# Patient Record
Sex: FEMALE | Race: WHITE | NOT HISPANIC OR LATINO | Employment: FULL TIME | URBAN - METROPOLITAN AREA
[De-identification: names, ages, dates, MRNs, and addresses within clinical notes are randomized per-mention and may not be internally consistent; named-entity substitution may affect disease eponyms.]

---

## 2021-09-23 ENCOUNTER — TELEPHONE (OUTPATIENT)
Dept: ORTHOPEDIC SURGERY | Facility: CLINIC | Age: 61
End: 2021-09-23

## 2021-09-23 NOTE — TELEPHONE ENCOUNTER
REQ FOR APPT / AWAITING RECORDS:  LEFT ANKLE FX 9-, [facility] TO SEND RECORDS.     [employer] INS[employer]ANCE, NO AUTO/WC CLAIM, PREV SURGERY OR ORTHOS

## 2021-09-27 ENCOUNTER — TELEPHONE (OUTPATIENT)
Dept: UROLOGY | Facility: CLINIC | Age: 61
End: 2021-09-27

## 2021-09-27 NOTE — TELEPHONE ENCOUNTER
REQ FOR APPT: LEFT ANKLE FX 9-, [facility] TO SEND RECORDS.     [employer] INSURANCE, NO AUTO/WC CLAIM, PREV SURGERY OR ORTHOS

## 2021-09-27 NOTE — TELEPHONE ENCOUNTER
LMOM FOR CB. NEED TO ASK WHO HER ORIGINAL UROLOGY CONSULT WAS WITH AND WHERE/WHEN SHE HAD IMAGING DONE.  I NEED TO SPEAK WITH HER PLEASE.

## 2021-09-29 ENCOUNTER — OFFICE VISIT (OUTPATIENT)
Dept: ORTHOPEDIC SURGERY | Facility: CLINIC | Age: 61
End: 2021-09-29

## 2021-09-29 ENCOUNTER — PREP FOR SURGERY (OUTPATIENT)
Dept: OTHER | Facility: HOSPITAL | Age: 61
End: 2021-09-29

## 2021-09-29 VITALS — HEIGHT: 67 IN | WEIGHT: 177 LBS | OXYGEN SATURATION: 95 % | HEART RATE: 82 BPM | BODY MASS INDEX: 27.78 KG/M2

## 2021-09-29 DIAGNOSIS — S82.892A CLOSED FRACTURE OF LEFT ANKLE, INITIAL ENCOUNTER: Primary | ICD-10-CM

## 2021-09-29 DIAGNOSIS — M25.572 LEFT ANKLE PAIN, UNSPECIFIED CHRONICITY: ICD-10-CM

## 2021-09-29 PROCEDURE — 29515 APPLICATION SHORT LEG SPLINT: CPT | Performed by: STUDENT IN AN ORGANIZED HEALTH CARE EDUCATION/TRAINING PROGRAM

## 2021-09-29 PROCEDURE — 99204 OFFICE O/P NEW MOD 45 MIN: CPT | Performed by: STUDENT IN AN ORGANIZED HEALTH CARE EDUCATION/TRAINING PROGRAM

## 2021-09-29 RX ORDER — MELOXICAM 15 MG/1
15 TABLET ORAL DAILY
COMMUNITY
End: 2021-09-29

## 2021-09-29 RX ORDER — CLOPIDOGREL BISULFATE 75 MG/1
TABLET ORAL
COMMUNITY
Start: 2021-09-17

## 2021-09-29 RX ORDER — CEFAZOLIN SODIUM 2 G/100ML
2 INJECTION, SOLUTION INTRAVENOUS ONCE
Status: CANCELLED | OUTPATIENT
Start: 2021-09-29 | End: 2021-09-29

## 2021-09-29 RX ORDER — BUDESONIDE AND FORMOTEROL FUMARATE DIHYDRATE 160; 4.5 UG/1; UG/1
2 AEROSOL RESPIRATORY (INHALATION)
COMMUNITY

## 2021-09-29 RX ORDER — HYDROCODONE BITARTRATE AND ACETAMINOPHEN 5; 325 MG/1; MG/1
1 TABLET ORAL EVERY 6 HOURS PRN
Qty: 10 TABLET | Refills: 0 | Status: SHIPPED | OUTPATIENT
Start: 2021-09-29 | End: 2021-10-01 | Stop reason: HOSPADM

## 2021-09-29 RX ORDER — CEFAZOLIN SODIUM IN 0.9 % NACL 3 G/100 ML
3 INTRAVENOUS SOLUTION, PIGGYBACK (ML) INTRAVENOUS ONCE
Status: CANCELLED | OUTPATIENT
Start: 2021-09-29 | End: 2021-09-29

## 2021-09-29 RX ORDER — ROPINIROLE 1 MG/1
TABLET, FILM COATED ORAL
COMMUNITY
Start: 2021-09-10

## 2021-09-29 RX ORDER — HYDROCODONE BITARTRATE AND ACETAMINOPHEN 5; 325 MG/1; MG/1
TABLET ORAL
COMMUNITY
Start: 2021-09-22 | End: 2021-09-29 | Stop reason: SDUPTHER

## 2021-09-29 RX ORDER — PANTOPRAZOLE SODIUM 40 MG/1
40 TABLET, DELAYED RELEASE ORAL DAILY
COMMUNITY

## 2021-09-29 RX ORDER — CLONAZEPAM 0.5 MG/1
TABLET ORAL
COMMUNITY
Start: 2021-09-20

## 2021-09-29 RX ORDER — ASPIRIN 81 MG/1
81 TABLET ORAL DAILY
COMMUNITY

## 2021-09-29 RX ORDER — MONTELUKAST SODIUM 10 MG/1
TABLET ORAL
COMMUNITY
Start: 2021-09-20

## 2021-09-29 RX ORDER — ATORVASTATIN CALCIUM 80 MG/1
TABLET, FILM COATED ORAL
COMMUNITY
Start: 2021-09-10

## 2021-09-29 RX ORDER — ALBUTEROL SULFATE 90 UG/1
2 AEROSOL, METERED RESPIRATORY (INHALATION) EVERY 4 HOURS PRN
COMMUNITY

## 2021-09-29 NOTE — PROGRESS NOTES
"Chief Complaint  Pain of the Left Ankle    Subjective          Rosie Patel presents to [facility] for   History of Present Illness    Rosie presents today for an evaluation of her left ankle. Reports as she was walking down the outside stairs of her mobile home, she fell down 2 stairs. Injury occurred on 9/22/21.  She experienced immediate pain and was initially seen at [facility].  X-rays obtained revealed a lateral malleolus fracture and a short leg splint was applied.  She has been weightbearing in the splint over the last week.  She denies any pain other than the left ankle.  She denies any wound secondary to the injury.  States she has \"weak\" ankles and has twisted her ankles multiple times in the past. Reports having a heart attack in January and having 6 stints palced. Currently on 81mg of aspirin and Plavix.  She did not follow-up with her cardiologist after the procedure.  Smokes roughly 4 cigarettes a day. Currently has kidney stones.  She is a nondiabetic.  Allergies   Allergen Reactions   • Corticosteroids Other (See Comments)     Pt states unable to take due to blood pressure   • Sulfa Antibiotics Itching        Social History     Socioeconomic History   • Marital status: Single     Spouse name: Not on file   • Number of children: Not on file   • Years of education: Not on file   • Highest education level: Not on file   Tobacco Use   • Smoking status: Current Every Day Smoker     Packs/day: 0.25     Types: Cigarettes   • Smokeless tobacco: Never Used   Vaping Use   • Vaping Use: Never used   Substance and Sexual Activity   • Alcohol use: Not Currently   • Drug use: Never   • Sexual activity: Defer        I reviewed the patient's chief complaint, history of present illness, review of systems, past medical history, surgical history, family history, social history, medications, and allergy list.     REVIEW OF SYSTEMS    Constitutional: Denies fevers, chills, " "weight loss  Cardiovascular: Denies chest pain, shortness of breath  Skin: Denies rashes, acute skin changes  Neurologic: Denies headache, loss of consciousness  MSK: left ankle pain      Objective   Vital Signs:   Pulse 82   Ht 170.2 cm (67\")   Wt 80.3 kg (177 lb)   SpO2 95%   BMI 27.72 kg/m²     Body mass index is 27.72 kg/m².    Physical Exam    General: Alert, no acute distress  Cardiac: Intact peripheral pulses  Pulmonary: Nonlabored respirations on room air  Left lower extremity: Short leg splint removed.  Abrasions over medial face of tibia. Moderate diffuse swelling. Bruising on lateral aspect of the ankle and hindfoot.  No wounds about the ankle.  I am able to wrinkle the skin laterally over the lateral malleolus.  Intact sensation over dorsal and plantar foot. Palpable dorsalis pedis pulse. Calf soft and nontender.    Orthopedic Injury Treatment    Date/Time: 9/29/2021 2:31 PM  Performed by: Carlos Jorge MD  Authorized by: Carlos Jorge MD   Injury location: ankle  Location details: left ankle    Anesthesia:  Local anesthesia used: no    Sedation:  Patient sedated: no    Immobilization: splint  Supplies used: Ortho-Glass (COTTON PADDING/ELASTIC BANDAGE)  Post-procedure neurovascular assessment: post-procedure neurovascularly intact  Patient tolerance: patient tolerated the procedure well with no immediate complications          Imaging Results (Most Recent)     Procedure Component Value Units Date/Time    XR Ankle 3+ View Left [[id number]] Resulted: 09/29/21 1532     Updated: 09/29/21 1533    Narrative:      Indications: Follow-up left lateral malleolus fracture    Views: AP, oblique, lateral left ankle    Findings: A displaced lateral malleolus fracture at the level of the   syndesmosis is seen.  Increased lateral and posterior displacement of the   fracture site compared to the injury films.  Talus remains reduced.    Ossicles adjacent to the medial malleolus likely related to prior trauma.    No " other acute fractures noted.  Early arthritic changes at the tibiotalar   joint are seen.  Calcaneal spur noted on the lateral view.    Comparative Data: Data found and reviewed today                     Assessment and Plan    Diagnoses and all orders for this visit:    1. Closed fracture of left ankle, initial encounter (Primary)    2. Left ankle pain, unspecified chronicity  -     XR Ankle 3+ View Left  -     HYDROcodone-acetaminophen (NORCO) 5-325 MG per tablet; Take 1 tablet by mouth Every 6 (Six) Hours As Needed for Moderate Pain .  Dispense: 10 tablet; Refill: 0    Other orders  -     Orthopedic Injury Treatment        Magnolia has a left lateral malleolus fracture.  She has had increased displacement across her fracture site compared to the injury films while ambulating in her splint over the last week.  We discussed both operative and nonoperative management options.  Given the increased displacement over the last week I am concerned that this is an unstable pattern.  We specifically discussed open reduction internal fixation of the left ankle fracture.  We discussed the primary benefits of surgery including improved fracture alignment and ankle stability.  We discussed surgical risks including bleeding, infection, damage to nerves and blood vessels, hardware complications, nonunion, malunion, persistent pain, stiffness, posttraumatic arthritis, anesthesia risk including mortality, DVT/PE, and need for additional procedures.  We also specifically discussed her cardiac risk given her recent myocardial infarction with stenting in January.   Patient expressed understanding and wished to proceed with surgical fixation.  We will reach out to cardiology.  We can delay up to 1 week if needed for optimization.  Discussed ice and elevation and nonweightbearing restriction prior to surgery.  A new short leg splint was applied.    Scribed for Carlos Jorge MD by Ofelia Stack MA.  09/29/21   13:47 EDT      Follow Up    Return for Surgical fixation.   Follow up after surgical fixation  Patient was given instructions and counseling regarding her condition or for health maintenance advice. Please see specific information pulled into the AVS if appropriate.       I have personally performed the services described in this document as scribed by the above individual and it is both accurate and complete.     Carlos Jorge MD  09/30/21  11:28 EDT

## 2021-09-30 ENCOUNTER — ANESTHESIA EVENT (OUTPATIENT)
Dept: PERIOP | Facility: HOSPITAL | Age: 61
End: 2021-09-30

## 2021-10-01 ENCOUNTER — HOSPITAL ENCOUNTER (OUTPATIENT)
Facility: HOSPITAL | Age: 61
Setting detail: HOSPITAL OUTPATIENT SURGERY
Discharge: HOME | End: 2021-10-01
Attending: STUDENT IN AN ORGANIZED HEALTH CARE EDUCATION/TRAINING PROGRAM | Admitting: STUDENT IN AN ORGANIZED HEALTH CARE EDUCATION/TRAINING PROGRAM

## 2021-10-01 ENCOUNTER — TELEPHONE (OUTPATIENT)
Dept: UROLOGY | Facility: CLINIC | Age: 61
End: 2021-10-01

## 2021-10-01 ENCOUNTER — APPOINTMENT (OUTPATIENT)
Dept: GENERAL RADIOLOGY | Facility: HOSPITAL | Age: 61
End: 2021-10-01

## 2021-10-01 ENCOUNTER — ANESTHESIA (OUTPATIENT)
Dept: PERIOP | Facility: HOSPITAL | Age: 61
End: 2021-10-01

## 2021-10-01 VITALS
TEMPERATURE: 96.7 F | OXYGEN SATURATION: 98 % | HEIGHT: 64 IN | SYSTOLIC BLOOD PRESSURE: 190 MMHG | RESPIRATION RATE: 18 BRPM | DIASTOLIC BLOOD PRESSURE: 86 MMHG | WEIGHT: 178.35 LBS | HEART RATE: 59 BPM | BODY MASS INDEX: 30.45 KG/M2

## 2021-10-01 DIAGNOSIS — S82.892A CLOSED FRACTURE OF LEFT ANKLE, INITIAL ENCOUNTER: ICD-10-CM

## 2021-10-01 LAB — QT INTERVAL: 500 MS

## 2021-10-01 PROCEDURE — C1713 ANCHOR/SCREW BN/BN,TIS/BN: HCPCS | Performed by: STUDENT IN AN ORGANIZED HEALTH CARE EDUCATION/TRAINING PROGRAM

## 2021-10-01 PROCEDURE — 73610 X-RAY EXAM OF ANKLE: CPT

## 2021-10-01 PROCEDURE — 25010000002 ROPIVACAINE PER 1 MG: Performed by: ANESTHESIOLOGY

## 2021-10-01 PROCEDURE — 25010000002 DEXAMETHASONE PER 1 MG: Performed by: NURSE ANESTHETIST, CERTIFIED REGISTERED

## 2021-10-01 PROCEDURE — 93010 ELECTROCARDIOGRAM REPORT: CPT | Performed by: INTERNAL MEDICINE

## 2021-10-01 PROCEDURE — 25010000002 FENTANYL CITRATE (PF) 50 MCG/ML SOLUTION: Performed by: NURSE ANESTHETIST, CERTIFIED REGISTERED

## 2021-10-01 PROCEDURE — 25010000003 CEFAZOLIN IN DEXTROSE 2-4 GM/100ML-% SOLUTION: Performed by: STUDENT IN AN ORGANIZED HEALTH CARE EDUCATION/TRAINING PROGRAM

## 2021-10-01 PROCEDURE — 93005 ELECTROCARDIOGRAM TRACING: CPT | Performed by: ANESTHESIOLOGY

## 2021-10-01 PROCEDURE — 25010000002 MIDAZOLAM PER 1MG: Performed by: ANESTHESIOLOGY

## 2021-10-01 PROCEDURE — 76000 FLUOROSCOPY <1 HR PHYS/QHP: CPT

## 2021-10-01 PROCEDURE — 27792 TREATMENT OF ANKLE FRACTURE: CPT | Performed by: STUDENT IN AN ORGANIZED HEALTH CARE EDUCATION/TRAINING PROGRAM

## 2021-10-01 PROCEDURE — 25010000002 HYDROMORPHONE PER 4 MG: Performed by: ANESTHESIOLOGY

## 2021-10-01 PROCEDURE — 25010000002 PROPOFOL 10 MG/ML EMULSION: Performed by: NURSE ANESTHETIST, CERTIFIED REGISTERED

## 2021-10-01 PROCEDURE — 25010000002 ONDANSETRON PER 1 MG: Performed by: NURSE ANESTHETIST, CERTIFIED REGISTERED

## 2021-10-01 DEVICE — BONE SCREW
Type: IMPLANTABLE DEVICE | Site: ANKLE | Status: FUNCTIONAL
Brand: VARIAX

## 2021-10-01 DEVICE — DISTAL LATERAL FIBULA PLATE, 4 HOLE
Type: IMPLANTABLE DEVICE | Site: ANKLE | Status: FUNCTIONAL
Brand: VARIAX

## 2021-10-01 DEVICE — KIRSCHNER WIRE: Type: IMPLANTABLE DEVICE | Site: ANKLE | Status: FUNCTIONAL

## 2021-10-01 DEVICE — LOCKING SCREW
Type: IMPLANTABLE DEVICE | Site: ANKLE | Status: FUNCTIONAL
Brand: VARIAX

## 2021-10-01 RX ORDER — DOCUSATE SODIUM 100 MG/1
100 CAPSULE, LIQUID FILLED ORAL 2 TIMES DAILY PRN
Qty: 20 CAPSULE | Refills: 0 | Status: SHIPPED | OUTPATIENT
Start: 2021-10-01

## 2021-10-01 RX ORDER — ONDANSETRON 4 MG/1
4 TABLET, FILM COATED ORAL EVERY 8 HOURS PRN
Qty: 30 TABLET | Refills: 0 | Status: SHIPPED | OUTPATIENT
Start: 2021-10-01 | End: 2021-10-01 | Stop reason: SDUPTHER

## 2021-10-01 RX ORDER — SODIUM CHLORIDE, SODIUM LACTATE, POTASSIUM CHLORIDE, CALCIUM CHLORIDE 600; 310; 30; 20 MG/100ML; MG/100ML; MG/100ML; MG/100ML
9 INJECTION, SOLUTION INTRAVENOUS CONTINUOUS PRN
Status: DISCONTINUED | OUTPATIENT
Start: 2021-10-01 | End: 2021-10-01 | Stop reason: HOSPADM

## 2021-10-01 RX ORDER — ONDANSETRON 2 MG/ML
4 INJECTION INTRAMUSCULAR; INTRAVENOUS ONCE AS NEEDED
Status: DISCONTINUED | OUTPATIENT
Start: 2021-10-01 | End: 2021-10-01 | Stop reason: HOSPADM

## 2021-10-01 RX ORDER — ROPIVACAINE HYDROCHLORIDE 5 MG/ML
INJECTION, SOLUTION EPIDURAL; INFILTRATION; PERINEURAL
Status: COMPLETED | OUTPATIENT
Start: 2021-10-01 | End: 2021-10-01

## 2021-10-01 RX ORDER — ONDANSETRON 4 MG/1
4 TABLET, FILM COATED ORAL EVERY 8 HOURS PRN
Qty: 30 TABLET | Refills: 0 | Status: SHIPPED | OUTPATIENT
Start: 2021-10-01

## 2021-10-01 RX ORDER — PROPOFOL 10 MG/ML
VIAL (ML) INTRAVENOUS AS NEEDED
Status: DISCONTINUED | OUTPATIENT
Start: 2021-10-01 | End: 2021-10-01 | Stop reason: SURG

## 2021-10-01 RX ORDER — MAGNESIUM HYDROXIDE 1200 MG/15ML
LIQUID ORAL AS NEEDED
Status: DISCONTINUED | OUTPATIENT
Start: 2021-10-01 | End: 2021-10-01 | Stop reason: HOSPADM

## 2021-10-01 RX ORDER — DEXAMETHASONE SODIUM PHOSPHATE 4 MG/ML
INJECTION, SOLUTION INTRA-ARTICULAR; INTRALESIONAL; INTRAMUSCULAR; INTRAVENOUS; SOFT TISSUE AS NEEDED
Status: DISCONTINUED | OUTPATIENT
Start: 2021-10-01 | End: 2021-10-01 | Stop reason: SURG

## 2021-10-01 RX ORDER — MIDAZOLAM HYDROCHLORIDE 2 MG/2ML
2 INJECTION, SOLUTION INTRAMUSCULAR; INTRAVENOUS ONCE
Status: COMPLETED | OUTPATIENT
Start: 2021-10-01 | End: 2021-10-01

## 2021-10-01 RX ORDER — OXYCODONE HYDROCHLORIDE 5 MG/1
5 TABLET ORAL
Status: DISCONTINUED | OUTPATIENT
Start: 2021-10-01 | End: 2021-10-01 | Stop reason: HOSPADM

## 2021-10-01 RX ORDER — HYDROMORPHONE HCL 110MG/55ML
0.5 PATIENT CONTROLLED ANALGESIA SYRINGE INTRAVENOUS
Status: DISCONTINUED | OUTPATIENT
Start: 2021-10-01 | End: 2021-10-01 | Stop reason: HOSPADM

## 2021-10-01 RX ORDER — HYDROCODONE BITARTRATE AND ACETAMINOPHEN 7.5; 325 MG/1; MG/1
1-2 TABLET ORAL EVERY 4 HOURS PRN
Qty: 30 TABLET | Refills: 0 | Status: SHIPPED | OUTPATIENT
Start: 2021-10-01 | End: 2021-10-18

## 2021-10-01 RX ORDER — LIDOCAINE HYDROCHLORIDE 20 MG/ML
INJECTION, SOLUTION INFILTRATION; PERINEURAL AS NEEDED
Status: DISCONTINUED | OUTPATIENT
Start: 2021-10-01 | End: 2021-10-01 | Stop reason: SURG

## 2021-10-01 RX ORDER — CEFAZOLIN SODIUM 2 G/100ML
2 INJECTION, SOLUTION INTRAVENOUS ONCE
Status: COMPLETED | OUTPATIENT
Start: 2021-10-01 | End: 2021-10-01

## 2021-10-01 RX ORDER — ACETAMINOPHEN 500 MG
1000 TABLET ORAL ONCE
Status: COMPLETED | OUTPATIENT
Start: 2021-10-01 | End: 2021-10-01

## 2021-10-01 RX ORDER — CEFAZOLIN SODIUM IN 0.9 % NACL 3 G/100 ML
3 INTRAVENOUS SOLUTION, PIGGYBACK (ML) INTRAVENOUS ONCE
Status: DISCONTINUED | OUTPATIENT
Start: 2021-10-01 | End: 2021-10-01 | Stop reason: DRUGHIGH

## 2021-10-01 RX ORDER — EPHEDRINE SULFATE 50 MG/ML
INJECTION, SOLUTION INTRAVENOUS AS NEEDED
Status: DISCONTINUED | OUTPATIENT
Start: 2021-10-01 | End: 2021-10-01 | Stop reason: SURG

## 2021-10-01 RX ORDER — FENTANYL CITRATE 50 UG/ML
INJECTION, SOLUTION INTRAMUSCULAR; INTRAVENOUS AS NEEDED
Status: DISCONTINUED | OUTPATIENT
Start: 2021-10-01 | End: 2021-10-01 | Stop reason: SURG

## 2021-10-01 RX ORDER — GLYCOPYRROLATE 0.2 MG/ML
INJECTION INTRAMUSCULAR; INTRAVENOUS AS NEEDED
Status: DISCONTINUED | OUTPATIENT
Start: 2021-10-01 | End: 2021-10-01 | Stop reason: SURG

## 2021-10-01 RX ORDER — HYDROCODONE BITARTRATE AND ACETAMINOPHEN 7.5; 325 MG/1; MG/1
1-2 TABLET ORAL EVERY 4 HOURS PRN
Qty: 30 TABLET | Refills: 0 | Status: SHIPPED | OUTPATIENT
Start: 2021-10-01 | End: 2021-10-01 | Stop reason: SDUPTHER

## 2021-10-01 RX ORDER — PROMETHAZINE HYDROCHLORIDE 12.5 MG/1
25 TABLET ORAL ONCE AS NEEDED
Status: DISCONTINUED | OUTPATIENT
Start: 2021-10-01 | End: 2021-10-01 | Stop reason: HOSPADM

## 2021-10-01 RX ORDER — PROMETHAZINE HYDROCHLORIDE 25 MG/1
25 SUPPOSITORY RECTAL ONCE AS NEEDED
Status: DISCONTINUED | OUTPATIENT
Start: 2021-10-01 | End: 2021-10-01 | Stop reason: HOSPADM

## 2021-10-01 RX ORDER — MEPERIDINE HYDROCHLORIDE 25 MG/ML
12.5 INJECTION INTRAMUSCULAR; INTRAVENOUS; SUBCUTANEOUS
Status: DISCONTINUED | OUTPATIENT
Start: 2021-10-01 | End: 2021-10-01 | Stop reason: HOSPADM

## 2021-10-01 RX ORDER — DOCUSATE SODIUM 100 MG/1
100 CAPSULE, LIQUID FILLED ORAL 2 TIMES DAILY PRN
Qty: 20 CAPSULE | Refills: 0 | Status: SHIPPED | OUTPATIENT
Start: 2021-10-01 | End: 2021-10-01 | Stop reason: SDUPTHER

## 2021-10-01 RX ADMIN — ROPIVACAINE HYDROCHLORIDE 30 ML: 5 INJECTION, SOLUTION EPIDURAL; INFILTRATION; PERINEURAL at 16:54

## 2021-10-01 RX ADMIN — GLYCOPYRROLATE 0.2 MCG: 0.2 INJECTION INTRAMUSCULAR; INTRAVENOUS at 18:39

## 2021-10-01 RX ADMIN — EPHEDRINE SULFATE 10 MG: 50 INJECTION INTRAVENOUS at 18:34

## 2021-10-01 RX ADMIN — GLYCOPYRROLATE 0.1 MCG: 0.2 INJECTION INTRAMUSCULAR; INTRAVENOUS at 18:31

## 2021-10-01 RX ADMIN — FENTANYL CITRATE 50 MCG: 50 INJECTION INTRAMUSCULAR; INTRAVENOUS at 18:17

## 2021-10-01 RX ADMIN — MIDAZOLAM HYDROCHLORIDE 2 MG: 1 INJECTION, SOLUTION INTRAMUSCULAR; INTRAVENOUS at 16:21

## 2021-10-01 RX ADMIN — EPHEDRINE SULFATE 10 MG: 50 INJECTION INTRAVENOUS at 18:40

## 2021-10-01 RX ADMIN — FENTANYL CITRATE 50 MCG: 50 INJECTION INTRAMUSCULAR; INTRAVENOUS at 18:30

## 2021-10-01 RX ADMIN — SODIUM CHLORIDE, POTASSIUM CHLORIDE, SODIUM LACTATE AND CALCIUM CHLORIDE: 600; 310; 30; 20 INJECTION, SOLUTION INTRAVENOUS at 19:35

## 2021-10-01 RX ADMIN — PROPOFOL 160 MG: 10 INJECTION, EMULSION INTRAVENOUS at 18:17

## 2021-10-01 RX ADMIN — GLYCOPYRROLATE 0.1 MCG: 0.2 INJECTION INTRAMUSCULAR; INTRAVENOUS at 18:33

## 2021-10-01 RX ADMIN — ONDANSETRON 4 MG: 2 INJECTION INTRAMUSCULAR; INTRAVENOUS at 18:14

## 2021-10-01 RX ADMIN — ACETAMINOPHEN 1000 MG: 500 TABLET ORAL at 14:52

## 2021-10-01 RX ADMIN — LIDOCAINE HYDROCHLORIDE 100 MG: 20 INJECTION, SOLUTION INFILTRATION; PERINEURAL at 18:17

## 2021-10-01 RX ADMIN — SODIUM CHLORIDE, POTASSIUM CHLORIDE, SODIUM LACTATE AND CALCIUM CHLORIDE 9 ML/HR: 600; 310; 30; 20 INJECTION, SOLUTION INTRAVENOUS at 16:21

## 2021-10-01 RX ADMIN — MIDAZOLAM HYDROCHLORIDE 2 MG: 1 INJECTION, SOLUTION INTRAMUSCULAR; INTRAVENOUS at 16:41

## 2021-10-01 RX ADMIN — DEXAMETHASONE SODIUM PHOSPHATE 4 MG: 4 INJECTION INTRA-ARTICULAR; INTRALESIONAL; INTRAMUSCULAR; INTRAVENOUS; SOFT TISSUE at 18:14

## 2021-10-01 RX ADMIN — HYDROMORPHONE HYDROCHLORIDE 0.5 MG: 2 INJECTION INTRAMUSCULAR; INTRAVENOUS; SUBCUTANEOUS at 14:52

## 2021-10-01 RX ADMIN — CEFAZOLIN SODIUM 2 G: 2 INJECTION, SOLUTION INTRAVENOUS at 18:12

## 2021-10-01 NOTE — ANESTHESIA PROCEDURE NOTES
Peripheral Block      Patient reassessed immediately prior to procedure    Patient location during procedure: pre-op  Reason for block: at surgeon's request and post-op pain management  Performed by  Anesthesiologist: Benjamin Daley MD  Preanesthetic Checklist  Completed: patient identified, IV checked, site marked, risks and benefits discussed, surgical consent, monitors and equipment checked, pre-op evaluation and timeout performed  Prep:  Pt Position: right lateral decubitus  Sterile barriers:alcohol skin prep, cap, gloves, mask, partial drape and washed/disinfected hands  Prep: ChloraPrep  Patient monitoring: blood pressure monitoring, continuous pulse oximetry and EKG  Procedure  Sedation:yes  Performed under: local infiltration  Guidance:ultrasound guided and nerve stimulator  ULTRASOUND INTERPRETATION. Using ultrasound guidance a 20 G gauge needle was placed in close proximity to the nerve, at which point, under ultrasound guidance anesthetic was injected in the area of the nerve and spread of the anesthesia was seen on ultrasound in close proximity thereto.  There were no abnormalities seen on ultrasound; a digital image was taken; and the patient tolerated the procedure with no complications. Images:still images obtained, printed/placed on chart    Laterality:left  Block Type:popliteal  Injection Technique:single-shot  Needle Type:echogenic  Needle Gauge:20 G (4in)  Resistance on Injection: none    Medications Used: ropivacaine (NAROPIN) 0.5 % injection, 30 mL      Post Assessment  Injection Assessment: negative aspiration for heme, no paresthesia on injection and incremental injection  Patient Tolerance:comfortable throughout block  Complications:no  Additional Notes  The block or continuous infusion is requested by the referring physician for management of postoperative pain, or pain related to a procedure. Ultrasound guidance (deemed medically necessary). Painless injection, pt was awake and conversant  during the procedure without complications. Needle and surrounding structures visualized throughout procedure. No adverse reactions or complications seen during this period. Post-procedure image showed no signs of complication, and anatomy was consistent with an uncomplicated nerve blockade.

## 2021-10-01 NOTE — INTERVAL H&P NOTE
H&P reviewed. The patient was examined and there are no changes to the H&P.    Splint open and skin evaluated in preop.  Swelling appears appropriate for surgery.      Electronically signed by Carlos Jorge MD, 10/01/21, 3:50 PM EDT.

## 2021-10-01 NOTE — TELEPHONE ENCOUNTER
CALLED PT TO MAKE APPT FOR KIDNEY STONES/SPOKE TO PT/PT SAID THAT SHE IS HAVING SURGERY ON HER ANKLE TODAY/PT DOES NOT WANT TO SCHEDULE ANY APPT RIGHT NOW FOR KIDNEY STONES/TOLD PT TO CALL US WHEN SHE WANTS TO SCHEDULED APPT

## 2021-10-01 NOTE — OP NOTE
ANKLE OPEN REDUCTION INTERNAL FIXATION  Procedure Report    Patient Name:  Rosie Patel  YOB: 1960    Date of Surgery:  10/1/2021     Indications:  Rosie is a 61-year-old female who sustained a ground-level fall on 9/22/2021 with a displaced left lateral malleolus fracture.  She was seen at an outside facility and splinted.  Repeat x-rays were obtained in my office and revealed interval displacement of the fracture after weightbearing in her splint.  We discussed treatment options including both operative and nonoperative management.  She elected to proceed with operative management.  We specifically discussed open reduction internal fixation of the left lateral malleolus fracture.  We discussed the primary benefits of the procedure including improved bone alignment and ankle stability.  We discussed surgical risk including bleeding, infection, damage to nerves and blood vessels, hardware complications, nonunion, malunion, persistent pain, stiffness, posttraumatic arthritis, anesthesia risk, DVT/PE, and need for additional procedures.  We specifically discussed her risks related to her cardiac history.  She ultimately elected to proceed and written consent was obtained.    Pre-op Diagnosis:   Closed fracture of left ankle, initial encounter [S82.892A]       Post-Op Diagnosis Codes:     * Closed fracture of left ankle, initial encounter [S82.892A]    Procedure/CPT® Codes:      Procedure(s):  Open reduction internal excision left lateral malleolus fracture    Staff:  Surgeon(s):  Carlos Jorge MD    Assistant: Mavis Avila RN    Anesthesia: General with Block    Estimated Blood Loss: 30 mL    Implants:    Implant Name Type Inv. Item Serial No.  Lot No. LRB No. Used Action   KWIRE SS [id number] NS - [id number] Implant KWIRE SS [id number]  [employer] XX Left 2 Implanted   PLT FIB VARIX D/L 4H 89MM - [id number] Implant PLT FIB VARIX D/L 4H 89MM  [employer] XX Left 1 Implanted    SCRW BONE VARIAX T10 3.5X20MM - [id number] Implant SCRW BONE VARIAX T10 3.5X20MM  [employer] XX Left 1 Implanted   SCRW BONE VARIAX T10 3.5X14MM - [id number] Implant SCRW BONE VARIAX T10 3.5X14MM  [employer] XX Left 1 Implanted   SCRW LK BONE VARIAX T10 3.5X16MM - [id number] Implant SCRW LK BONE VARIAX T10 3.5X16MM  [employer] XX Left 4 Implanted   SCRW LK BONE VARIAX T10 3.5X12MM - [id number] Implant SCRW LK BONE VARIAX T10 3.5X12MM  [employer] XX Left 2 Implanted   SCRW BONE VARIAX T10 3.5X12MM - [id number] Implant SCRW BONE VARIAX T10 3.5X12MM  W[employer]EN [employer]ASSON XX Left 1 Implanted       Specimen:          None        Findings: Displaced left lateral malleolus fracture, stable syndesmosis    Complications: None    Description of Procedure: The patient was met in the preoperative holding area and the operative extremity was marked.  The skin was evaluated in preop and deemed appropriate to proceed with surgical management.  A preoperative peripheral nerve block was performed by the anesthesia team.  The patient was transported the operating room and laid supine on the operating room table.  General anesthesia was induced without complication.  A left upper thigh tourniquet was applied.  The left lower extremity was prepped and draped in the usual sterile fashion.  2 g of preoperative Ancef were administered.  A timeout was taken to ensure the appropriate patient, procedure, and procedural site.  All were in agreement.  The left lower extremity was exsanguinated and the tourniquet was inflated to 300 mmHg.  I made an 8 cm longitudinal incision over the lateral malleolus.  Sharp dissection was carried down through the skin and subcutaneous tissues.  The superficial peroneal nerve was not directly encountered.  The fracture site was identified.  I sharply elevated the periosteum off of the fracture site.  I slid a soft tissue elevator proximally to make room for plate placement.  The fracture site was  identified.  This was oblique in nature.  The fracture hematoma was debrided and the fracture was irrigated. Multiple pointed clamps were used to reduce the fracture.    I then placed a 3.5 mm bicortical nonlocking screw in lag fashion across the fracture site.  This stabilized the fracture nicely..  Reduction was confirmed with C arm.  I then selected a Katheryn anatomic distal fibula plate.  The plate was contoured to fit the bone and temporarily held in place with K wires.  Plate positioning was confirmed with C arm.  Once satisfactory I began my fixation.  I started proximal and placed a bicortical screw to stabilize the plate and secure it down to bone.  I then moved distally.    I placed 4 unicortical nonlocking screws in the distal screw cluster.  I placed a bicortical locking screw in the most distal diaphyseal hole. I then moved proximally and placed an additional bicortical nonlocking screw in the most proximal hole of the plate.  The 2 nonlocking screws were final tightened and a bicortical locking screw was placed in the remaining diaphyseal hole.  I was satisfied with the reduction and fixation construct on the lateral malleolus which was confirmed with C-arm fluoroscopy. Final images including AP, oblique, lateral, and stress views were obtained.  The syndesmosis was stable. I was satisfied with the reduction and fixation construct.  No hardware complications were noted.  Wounds were then thoroughly irrigated with normal saline.  Deep tissues were closed with 2-0 Vicryl and 3-0 Vicryl.  Skin was closed with 3-0 nylon in interrupted mattress fashion.   Surgical incisions were dressed with Xeroform, 4 x 4, ABD and a well-padded short leg splint was applied.  The tourniquet was released.  The patient woke from anesthesia without complication and was transferred to the recovery room in stable condition.  All surgical counts were correct.  The patient had a palpable dorsalis pedis pulse prior to leaving the  operating room.    Assistant: Mavis Avila RN  was responsible for performing the following activities: Retraction, Suction, Irrigation and Placing Dressing and their skilled assistance was necessary for the success of this case.    Carlos Jorge MD     Date: 10/1/2021  Time: 19:39 EDT

## 2021-10-01 NOTE — ANESTHESIA POSTPROCEDURE EVALUATION
Patient: Rosie Patel    Procedure Summary     Date: 10/01/21 Room / Location: [facility] OR 03 / [facility] MAIN OR    Anesthesia Start: 1812 Anesthesia Stop: 1936    Procedure: ANKLE OPEN REDUCTION INTERNAL FIXATION (Left Ankle) Diagnosis:       Closed fracture of left ankle, initial encounter      (Closed fracture of left ankle, initial encounter [S82.892A])    Surgeons: Carlos Jorge MD Provider: Benjamin Daley MD    Anesthesia Type: general, regional, general with block ASA Status: 3          Anesthesia Type: general, regional, general with block    Vitals  Vitals Value Taken Time   /87 10/01/21 1941   Temp     Pulse 58 10/01/21 1943   Resp     SpO2 96 % 10/01/21 1943   Vitals shown include unvalidated device data.        Post Anesthesia Care and Evaluation    Patient location during evaluation: bedside  Patient participation: complete - patient participated  Level of consciousness: awake  Pain management: adequate  Airway patency: patent  Anesthetic complications: No anesthetic complications  PONV Status: none  Cardiovascular status: acceptable and stable  Respiratory status: acceptable and room air  Hydration status: acceptable    Comments: An Anesthesiologist personally participated in the most demanding procedures (including induction and emergence if applicable) in the anesthesia plan, monitored the course of anesthesia administration at frequent intervals and remained physically present and available for immediate diagnosis and treatment of emergencies.

## 2021-10-01 NOTE — TELEPHONE ENCOUNTER
----- Message from Tiffany Poe sent at 10/1/2021 12:21 PM EDT -----  Regarding: FW: APPT  CAN YOU ALL SCHEDULE HER FOR NEXT WEEK? ALSO TELL HER IF SHE HAS THE DISC FROM HER CT TO PLEASE BRING IT. THANKS LADIES!  ----- Message -----  From: Alyce Romero MD  Sent: 9/27/2021   1:15 PM EDT  To: Tiffany Poe  Subject: RE: APPT                                         In the scanned in packet from [facility], there is no CT scan or other imaging.  Please obtain the imaging so I can review it and then determine the timing of appointment    Also, please ask you her initial urology consultation was with and obtain those records too.  Let me know when they are available.  Thanks so much  ----- Message -----  From: Tiffany Poe  Sent: 9/27/2021  10:39 AM EDT  To: Alyce Romero MD  Subject: FW: APPT                                         When would you like to see?  ----- Message -----  From: Evan Glynn RegSched Rep  Sent: 9/27/2021  10:22 AM EDT  To: Tiffany Poe  Subject: APPT                                             DR LEBRON REF PT FOR SECOND OPINION ON [place], FARIDA ON CALL 9/23, PLEASE ADVISE FOR AN APPT.

## 2021-10-01 NOTE — ANESTHESIA PREPROCEDURE EVALUATION
Anesthesia Evaluation     Patient summary reviewed and Nursing notes reviewed   no history of anesthetic complications:  NPO Solid Status: > 8 hours  NPO Liquid Status: > 2 hours           Airway   Mallampati: III  TM distance: >3 FB  Neck ROM: full  Possible difficult intubation  Dental    (+) lower dentures and upper dentures    Pulmonary - normal exam    breath sounds clear to auscultation  (+) COPD,   Cardiovascular - normal exam  Exercise tolerance: good (4-7 METS)    Patient on routine beta blocker and Beta blocker given within 24 hours of surgery  Rhythm: regular    (+) hypertension, past MI  6-12 months,       Neuro/Psych- negative ROS  GI/Hepatic/Renal/Endo    (+)   renal disease,     Musculoskeletal (-) negative ROS    Abdominal    Substance History - negative use     OB/GYN negative ob/gyn ROS         Other - negative ROS       ROS/Med Hx Other: Hx of MI with 6 stents place 01/2021 - Dr. barton seen last 02/2021. No CP/SOA, METS >4-     will stay on plavix and ASA, Dr. Jorge was made aware per chart notes                Anesthesia Plan    ASA 3     general, regional and general with block   (Patient understands anesthesia not responsible for dental damage. Regional anesthesia options discussed with patient. Pt accepts regional block.)  intravenous induction     Anesthetic plan, all risks, benefits, and alternatives have been provided, discussed and informed consent has been obtained with: patient.  Use of blood products discussed with patient .   Plan discussed with CRNA.

## 2021-10-02 NOTE — DISCHARGE INSTRUCTIONS
DISCHARGE INSTRUCTIONS  ORTHOPEDICS      ? For your surgery you had:  ? General anesthesia (you may have a sore throat for the first 24 hours)  ? You may experience dizziness, drowsiness, or light-headedness for several hours following surgery  ? Do not stay alone today or tonight.  ? Limit your activity for 24 hours.  ? Resume your diet slowly.  Follow whatever special dietary instructions you may have been given by the doctor.  ? You should not drive or operate machinery or drink alcohol for 24 hours or while you are taking pain medication.  ? You should not sign any legally binding documents.  ? If you had an axillary or regional block, you will not have control of the involved limb for up to 12 hours.  ? You may shower or bathe: tomorrow, keep dressing clean and dry.  ? Sleep with the injured part elevated on a pillow.  ? Medications per physician's instructions as indicated on Discharge Medication Information Sheet.  ? Follow verbal instructions of your doctor.  ? Sit with the lower leg propped up on a footstool or chair with pillows.  ? Exercise toes for 10 minutes every hour while awake. ? Ice bag to injured area for 72 hours.  Apply 20 minutes on - 20 minutes off.  Never place ice directly on skin or cast. Avoid getting cast or dressing wet.  ? In addition to these instructions, follow the discharge instructions on postoperative arthroscopic surgery.    NOTIFY THE PHYSICIAN IF YOU EXPERIENCE:  1. Numbness of toes.  2. Inability to move toes.  3. Extreme coldness, paleness or blue dis-coloration or toes.  4. Excessive swelling of affected surgical site or swelling that causes the cast to rub or cut into skin.  5. Pain unrelieved by pain medication  6. Nausea/vomiting not relieved by prescribed medication  7. Unable to urinate in 6 hours after surgery  8. Temperature greater than 101 degree Fahrenheit or chills  9. If unable to reach your doctor, please go to the closest emergency room    Orthopedic  instructions:  No weightbearing on the operative extremity.    Keep your splint on, intact, clean, and dry.    Ice and elevate.  Monitor for increasing pain, drainage through the splint, fevers, chills.   Continue your regular blood thinning medications.  Call the office with any concerns.   Follow-up with Dr. Jorge in 2 weeks.

## 2021-10-18 ENCOUNTER — OFFICE VISIT (OUTPATIENT)
Dept: ORTHOPEDIC SURGERY | Facility: CLINIC | Age: 61
End: 2021-10-18

## 2021-10-18 VITALS — HEART RATE: 69 BPM | BODY MASS INDEX: 27.91 KG/M2 | OXYGEN SATURATION: 97 % | HEIGHT: 67 IN | WEIGHT: 177.8 LBS

## 2021-10-18 DIAGNOSIS — S82.892D CLOSED FRACTURE OF LEFT ANKLE WITH ROUTINE HEALING, SUBSEQUENT ENCOUNTER: Primary | ICD-10-CM

## 2021-10-18 PROCEDURE — 29405 APPL SHORT LEG CAST: CPT | Performed by: STUDENT IN AN ORGANIZED HEALTH CARE EDUCATION/TRAINING PROGRAM

## 2021-10-18 PROCEDURE — 99024 POSTOP FOLLOW-UP VISIT: CPT | Performed by: STUDENT IN AN ORGANIZED HEALTH CARE EDUCATION/TRAINING PROGRAM

## 2021-10-18 RX ORDER — HYDROCODONE BITARTRATE AND ACETAMINOPHEN 5; 325 MG/1; MG/1
1 TABLET ORAL EVERY 6 HOURS PRN
Qty: 30 TABLET | Refills: 0 | Status: SHIPPED | OUTPATIENT
Start: 2021-10-18 | End: 2021-11-01 | Stop reason: SDUPTHER

## 2021-10-18 NOTE — PROGRESS NOTES
"Chief Complaint  Pain of the Left Ankle    Subjective          Rosie Patel presents to [facility] for   History of Present Illness    Rosie presents today for follow up of left ankle lateral malleolus fracture. She is post op from an open reduction internal fixation performed on 10/01/2021. Patient reports that she is doing okay overall. She has some soreness and stiffness to the left ankle. Describes intermittent tingling in her left lower extremity. Patient is requesting more pain medication. Patient affirms that she occasionally walks on her foot as she does not like the crutches. She has a scooter but states it hurts her knee. Denies fever and chills.  Patient is still taking aspirin 325 mg daily As directed.     Allergies   Allergen Reactions   • Corticosteroids Other (See Comments)     Pt states unable to take due to blood pressure   • Sulfa Antibiotics Itching        Social History     Socioeconomic History   • Marital status: Single   Tobacco Use   • Smoking status: Current Every Day Smoker     Packs/day: 0.25     Types: Cigarettes     Start date: 10/1/1972   • Smokeless tobacco: Never Used   Vaping Use   • Vaping Use: Never used   Substance and Sexual Activity   • Alcohol use: Not Currently   • Drug use: Never   • Sexual activity: Defer        I reviewed the patient's chief complaint, history of present illness, review of systems, past medical history, surgical history, family history, social history, medications, and allergy list.     REVIEW OF SYSTEMS    Constitutional: Denies fevers, chills, weight loss  Cardiovascular: Denies chest pain, shortness of breath  Skin: Denies rashes, acute skin changes  Neurologic: Denies headache, loss of consciousness  MSK: Right ankle pain.       Objective   Vital Signs:   Pulse 69   Ht 170.2 cm (67\")   Wt 80.6 kg (177 lb 12.8 oz)   SpO2 97%   BMI 27.85 kg/m²     Body mass index is 27.85 kg/m².    Physical Exam      General: Alert, " no acute distress  Left lower extremity: Healing incision over the lateral aspect of the left ankle.  Intact sensation over the dorsal and plantar foot.  Scattered areas of scabbing.  No cellulitis.  No active drainage.  Mild swelling.  Palpable dorsalis pedis pulse.  Medial abrasion over the distal tibia is healing well.  Calf soft and nontender.      Orthopedic Injury Treatment    Date/Time: 10/18/2021 1:31 PM  Performed by: Carlos Jorge MD  Authorized by: Carlos Jorge MD   Injury location: ankle  Location details: left ankle  Pre-procedure neurovascular assessment: neurovascularly intact    Anesthesia:  Local anesthesia used: no    Sedation:  Patient sedated: no    Immobilization: cast  Supplies used: cotton padding (FIBERGLASS)  Post-procedure neurovascular assessment: post-procedure neurovascularly intact  Patient tolerance: patient tolerated the procedure well with no immediate complications  Comments: Patient was placed in fiberglass cast today.  The patient tolerated the procedure without any complications. APPLIED BY RICARDO DAMON MA            Imaging Results (Most Recent)     Procedure Component Value Units Date/Time    XR Ankle 3+ View Left [[id number]] Resulted: 10/18/21 1633     Updated: 10/18/21 1635    Narrative:      Indications: Follow-up left ankle fracture    Views: AP, oblique, lateral left ankle    Findings: A left lateral malleolus fracture is fixed with a single   interfragmentary screw and a lateral plate/screw construct.  Fracture   alignment and hardware positioning appears stable compared to the   intraoperative films.  No hardware complications are noted.  The talus   remains well reduced beneath the tibia.  The syndesmosis appears   appropriately aligned.    Comparative Data: Comparative data found and reviewed today                     Assessment and Plan    Diagnoses and all orders for this visit:    1. Closed fracture of left ankle with routine healing, subsequent encounter  (Primary)  -     XR Ankle 3+ View Left  -     HYDROcodone-acetaminophen (NORCO) 5-325 MG per tablet; Take 1 tablet by mouth Every 6 (Six) Hours As Needed for Moderate Pain  or Severe Pain .  Dispense: 30 tablet; Refill: 0    Other orders  -     Orthopedic Injury Treatment      Rosie presents today for follow up of left ankle lateral malleolus fracture. She is post op from an open reduction internal fixation performed on 10/01/2021.  Patient was placed in a cast today.  She is instructed to remain nonweightbearing.  She is encouraged to add padding to her knee scooter in order to use this and stay off of her ankle.  Patient is unable to return to work at this time due to no options of light duty at work.  Her work requires her to be on her feet.  Patient will follow up in 2 weeks.  At this time, her cast will be removed to check her incision site.  A new cast will then be placed for an additional 2 weeks.  Patient is instructed to continue to ice and elevate.      Call or return if symptoms worsen or patient has any concerns.     Scribed for Carlos Jorge MD by Carlos Jorge MD  10/18/2021   13:17 EDT         Follow Up   Return in about 2 weeks (around 11/1/2021).  Patient was given instructions and counseling regarding her condition or for health maintenance advice. Please see specific information pulled into the AVS if appropriate.       I have personally performed the services described in this document as scribed by the above individual and it is both accurate and complete.     Carlos Jorge MD  10/22/21  10:51 EDT

## 2021-11-01 DIAGNOSIS — S82.892D CLOSED FRACTURE OF LEFT ANKLE WITH ROUTINE HEALING, SUBSEQUENT ENCOUNTER: ICD-10-CM

## 2021-11-01 RX ORDER — HYDROCODONE BITARTRATE AND ACETAMINOPHEN 5; 325 MG/1; MG/1
1 TABLET ORAL EVERY 6 HOURS PRN
Qty: 30 TABLET | Refills: 0 | Status: SHIPPED | OUTPATIENT
Start: 2021-11-01

## 2021-11-01 NOTE — TELEPHONE ENCOUNTER
PATIENT HAD AN APPT TODAY 11-1-21 HAD TO RS CAR WONT START MOVED APPT TO [facility] 11-3-21 , PT STATES OUT OF PAIN MED AND REQ MORE.    [facility]

## 2021-11-03 ENCOUNTER — OFFICE VISIT (OUTPATIENT)
Dept: ORTHOPEDIC SURGERY | Facility: CLINIC | Age: 61
End: 2021-11-03

## 2021-11-03 ENCOUNTER — APPOINTMENT (OUTPATIENT)
Dept: GENERAL RADIOLOGY | Facility: HOSPITAL | Age: 61
End: 2021-11-03

## 2021-11-03 VITALS — HEIGHT: 67 IN | WEIGHT: 177 LBS | HEART RATE: 57 BPM | OXYGEN SATURATION: 96 % | BODY MASS INDEX: 27.78 KG/M2

## 2021-11-03 DIAGNOSIS — M25.572 LEFT ANKLE PAIN, UNSPECIFIED CHRONICITY: Primary | ICD-10-CM

## 2021-11-03 DIAGNOSIS — S82.892D CLOSED FRACTURE OF LEFT ANKLE WITH ROUTINE HEALING, SUBSEQUENT ENCOUNTER: ICD-10-CM

## 2021-11-03 PROCEDURE — 99024 POSTOP FOLLOW-UP VISIT: CPT | Performed by: STUDENT IN AN ORGANIZED HEALTH CARE EDUCATION/TRAINING PROGRAM

## 2021-11-03 PROCEDURE — 29405 APPL SHORT LEG CAST: CPT | Performed by: STUDENT IN AN ORGANIZED HEALTH CARE EDUCATION/TRAINING PROGRAM

## 2021-11-03 NOTE — PROGRESS NOTES
"Chief Complaint  Follow-up of the Left Ankle    Subjective          Rosie Patel presents to [facility] for   History of Present Illness    Rosie presents today for follow up of left ankle lateral malleolus fracture. She is post op from an open reduction internal fixation performed on 10/01/2021. She reports that she is doing okay with pain but affirms she has been walking on her ankle. She denies new falls or injuries. She states she has been taking calcium and continues taking her Aspirin and Plavix.      Allergies   Allergen Reactions   • Corticosteroids Other (See Comments)     Pt states unable to take due to blood pressure   • Sulfa Antibiotics Itching        Social History     Socioeconomic History   • Marital status: Single   Tobacco Use   • Smoking status: Former Smoker     Packs/day: 0.25     Types: Cigarettes     Start date: 10/1/1972   • Smokeless tobacco: Never Used   Vaping Use   • Vaping Use: Never used   Substance and Sexual Activity   • Alcohol use: Not Currently   • Drug use: Never   • Sexual activity: Defer        I reviewed the patient's chief complaint, history of present illness, review of systems, past medical history, surgical history, family history, social history, medications, and allergy list.     REVIEW OF SYSTEMS    Constitutional: Denies fevers, chills, weight loss  Cardiovascular: Denies chest pain, shortness of breath  Skin: Denies rashes, acute skin changes  Neurologic: Denies headache, loss of consciousness  MSK: Left ankle pain.       Objective   Vital Signs:   Pulse 57   Ht 170.2 cm (67\")   Wt 80.3 kg (177 lb)   SpO2 96%   BMI 27.72 kg/m²     Body mass index is 27.72 kg/m².    Physical Exam    General: Alert, no acute distress  Left lower extremity: Cast removed for exam. Healing lateral incision. No erythema or active drainage noted.  Scattered scabbing.  Previous abrasion over the tibia healing appropriately. Sensation intact over the " dorsal and plantar foot. Demonstrates active ankle plantarflexion and dorsiflexion. Palpable pedal pulses. Calf soft.     Orthopedic Injury Treatment    Date/Time: 11/3/2021 9:56 AM  Performed by: Carlos Jorge MD  Authorized by: Carlos Jorge MD   Injury location: ankle  Location details: left ankle  Immobilization: cast  Supplies used: cotton padding (fiberglass)  Patient tolerance: patient tolerated the procedure well with no immediate complications  Comments: Patient was placed in fiberglass cast today.  The patient tolerated the procedure without any complications. Applied by Juli SORIANO          Imaging Results (Most Recent)     Procedure Component Value Units Date/Time    XR Ankle 3+ View Left [[id number]] Resulted: 11/03/21 1034     Updated: 11/03/21 1037    Narrative:      Indications: Follow-up left ankle fracture    Views: AP, lateral, oblique left ankle    Findings: Left lateral malleolus fracture and fixation construct again   seen.  Alignment stable.  No hardware complications noted.  Talus well   reduced beneath the tibia.  Syndesmosis well aligned.    Comparative Data: Comparative data found and reviewed today                 Assessment and Plan    Diagnoses and all orders for this visit:    1. Left ankle pain, unspecified chronicity (Primary)  -     XR Ankle 3+ View Left    2. Closed fracture of left ankle with routine healing, subsequent encounter    Other orders  -     Orthopedic Injury Treatment      Rosie presents today for follow up of left ankle lateral malleolus fracture. She is post op from an open reduction internal fixation performed on 10/01/2021. She reports that she is doing okay with pain but affirms she has been walking on her ankle. Her cast was removed today for new imagining and examination. Patient was placed in a new cast. Cast will remain for 2 weeks and then patient will be transitioned to fracture boot. Patient instructed to remain nonweightbearing. Continue taking Aspirin and  Plavix. Follow up in 2 weeks. Will obtain x-rays of left ankle at next visit.       Call or return if symptoms worsen or patient has any concerns.   Will obtain X-Rays of left ankle at next visit.     Scribed for Carlos Jorge MD by Carlos Jorge MD  11/03/2021   08:46 EDT         Follow Up   Return in about 2 weeks (around 11/17/2021).  Patient was given instructions and counseling regarding her condition or for health maintenance advice. Please see specific information pulled into the AVS if appropriate.       I have personally performed the services described in this document as scribed by the above individual and it is both accurate and complete.     Carlos Jorge MD  11/05/21  09:20 EDT

## 2021-11-17 ENCOUNTER — OFFICE VISIT (OUTPATIENT)
Dept: ORTHOPEDIC SURGERY | Facility: CLINIC | Age: 61
End: 2021-11-17

## 2021-11-17 VITALS — OXYGEN SATURATION: 97 % | WEIGHT: 177 LBS | HEART RATE: 56 BPM | BODY MASS INDEX: 27.78 KG/M2 | HEIGHT: 67 IN

## 2021-11-17 DIAGNOSIS — Z98.890 S/P ORIF (OPEN REDUCTION INTERNAL FIXATION) FRACTURE: ICD-10-CM

## 2021-11-17 DIAGNOSIS — S82.892D CLOSED FRACTURE OF LEFT ANKLE WITH ROUTINE HEALING, SUBSEQUENT ENCOUNTER: Primary | ICD-10-CM

## 2021-11-17 DIAGNOSIS — Z87.81 S/P ORIF (OPEN REDUCTION INTERNAL FIXATION) FRACTURE: ICD-10-CM

## 2021-11-17 PROCEDURE — 99024 POSTOP FOLLOW-UP VISIT: CPT | Performed by: STUDENT IN AN ORGANIZED HEALTH CARE EDUCATION/TRAINING PROGRAM

## 2021-11-17 RX ORDER — TRAZODONE HYDROCHLORIDE 50 MG/1
TABLET ORAL
COMMUNITY
Start: 2021-10-08

## 2021-11-17 RX ORDER — TRAMADOL HYDROCHLORIDE 50 MG/1
50 TABLET ORAL EVERY 6 HOURS PRN
Qty: 20 TABLET | Refills: 0 | Status: SHIPPED | OUTPATIENT
Start: 2021-11-17 | End: 2021-11-30

## 2021-11-17 RX ORDER — SERTRALINE HYDROCHLORIDE 100 MG/1
TABLET, FILM COATED ORAL
COMMUNITY
Start: 2021-10-08

## 2021-11-17 NOTE — PROGRESS NOTES
"Chief Complaint  Pain of the Left Ankle    Subjective          Rosie Patel presents to [facility] for   History of Present Illness    The patinet presents here today for follow up evaluation of the left ankle. The patient is S/P ORIF left ankle fracture 10/1/21.  Her cast was removed today. She reports she is still having some pain. She has no other complaints.   Allergies   Allergen Reactions   • Corticosteroids Other (See Comments)     Pt states unable to take due to blood pressure   • Sulfa Antibiotics Itching        Social History     Socioeconomic History   • Marital status: Single   Tobacco Use   • Smoking status: Former Smoker     Packs/day: 0.25     Types: Cigarettes     Start date: 10/1/1972   • Smokeless tobacco: Never Used   Vaping Use   • Vaping Use: Never used   Substance and Sexual Activity   • Alcohol use: Not Currently   • Drug use: Never   • Sexual activity: Defer        I reviewed the patient's chief complaint, history of present illness, review of systems, past medical history, surgical history, family history, social history, medications, and allergy list.     REVIEW OF SYSTEMS    Constitutional: Denies fevers, chills, weight loss  Cardiovascular: Denies chest pain, shortness of breath  Skin: Denies rashes, acute skin changes  Neurologic: Denies headache, loss of consciousness  MSK: Left ankle pain      Objective   Vital Signs:   Pulse 56   Ht 170.2 cm (67\")   Wt 80.3 kg (177 lb)   SpO2 97%   BMI 27.72 kg/m²     Body mass index is 27.72 kg/m².    Physical Exam    Left ankle- Healing lateral incision. Scattered scabbing. No medial tenderness. Positive EHL, FHL, GS and TA. Sensation intact to all 5 nerves of the foot. Positive pulses. No cast wounds. Can wiggle toes. Intact plantar flexion and dorsiflexion of the ankle.     Procedures    Imaging Results (Most Recent)     Procedure Component Value Units Date/Time    XR Ankle 3+ View Left [[id number]] Resulted: " 11/17/21 1541     Updated: 11/17/21 1542    Narrative:      Indications: Follow-up left lateral malleolus fracture    Views: AP, oblique, lateral left ankle    Findings: Lateral malleolus fracture again seen.  Callus forming at the   fracture site.  No hardware complications noted.  Talus remains well   reduced beneath the tibia.  The syndesmosis appears appropriately aligned.    Baseline arthritic changes appear stable.    Comparative Data: Comparative data found and reviewed today                     Assessment and Plan    Diagnoses and all orders for this visit:    1. Closed fracture of left ankle with routine healing, subsequent encounter (Primary)  -     XR Ankle 3+ View Left  -     traMADol (ULTRAM) 50 MG tablet; Take 1 tablet by mouth Every 6 (Six) Hours As Needed for Moderate Pain .  Dispense: 20 tablet; Refill: 0    2. S/P ORIF (open reduction internal fixation) fracture  -     Ambulatory Referral to Physical Therapy Evaluate and treat, POST OP, Ortho; Stretching, ROM, Strengthening; Full weight bearing (in boot)        Discussed the treatment plan with the patient.  I reviewed the x-rays with the patient that were obtained today. She was placed into a walking boot. She can ambulate with the boot, no weightbearing without the boot. Work note given for seated work only. Order for physical therapy given today. Prescription for Tramadol given today.     Call or return if symptoms worsen or patient has any concerns.   Will obtain X-Rays of Left ankle at next visit.     Scribed for Carlos Jorge MD by Carlos Jorge MD  11/17/2021   10:11 EST         Follow Up   Return in about 3 weeks (around 12/8/2021).  Patient was given instructions and counseling regarding her condition or for health maintenance advice. Please see specific information pulled into the AVS if appropriate.       I have personally performed the services described in this document as scribed by the above individual and it is both accurate and  complete.     Carlos Jorge MD  11/19/21  09:30 EST

## 2021-11-30 ENCOUNTER — TELEPHONE (OUTPATIENT)
Dept: ORTHOPEDIC SURGERY | Facility: CLINIC | Age: 61
End: 2021-11-30

## 2021-11-30 DIAGNOSIS — S82.892D CLOSED FRACTURE OF LEFT ANKLE WITH ROUTINE HEALING, SUBSEQUENT ENCOUNTER: Primary | ICD-10-CM

## 2021-11-30 RX ORDER — TRAMADOL HYDROCHLORIDE 50 MG/1
50 TABLET ORAL EVERY 6 HOURS PRN
Qty: 20 TABLET | Refills: 0 | Status: SHIPPED | OUTPATIENT
Start: 2021-11-30

## 2021-11-30 NOTE — TELEPHONE ENCOUNTER
PT RESCHEDULED APPT FROM 12-8-21 TO 12-13-21 AND THEN ASKED ABOUT MORE PAIN MEDICINE. I LET HER KNOW THAT I WOULD INQUIRE ABOUT IT FOR HER.

## 2021-12-13 ENCOUNTER — TELEPHONE (OUTPATIENT)
Dept: ORTHOPEDIC SURGERY | Facility: CLINIC | Age: 61
End: 2021-12-13

## 2021-12-13 NOTE — TELEPHONE ENCOUNTER
Caller: KATHIA  Relationship to Patient: SELF    Phone Number: [phone]  Reason for Call: PT CALLED STATING THAT SHE HAS SOME QUESTIONS PERTAINING TO HER ANKLE. SHE DID HAVE AN APPT TODAY BUT HAD CAR TROUBLE AND R/S FOR 12/20/2021 PLEASE ADVISE PT AT ABOVE PHONE NUMBER

## 2021-12-20 ENCOUNTER — OFFICE VISIT (OUTPATIENT)
Dept: ORTHOPEDIC SURGERY | Facility: CLINIC | Age: 61
End: 2021-12-20

## 2021-12-20 VITALS — BODY MASS INDEX: 27.78 KG/M2 | WEIGHT: 177 LBS | HEIGHT: 67 IN | OXYGEN SATURATION: 95 % | HEART RATE: 58 BPM

## 2021-12-20 DIAGNOSIS — M25.572 LEFT ANKLE PAIN, UNSPECIFIED CHRONICITY: Primary | ICD-10-CM

## 2021-12-20 DIAGNOSIS — Z87.81 S/P ORIF (OPEN REDUCTION INTERNAL FIXATION) FRACTURE: ICD-10-CM

## 2021-12-20 DIAGNOSIS — Z98.890 S/P ORIF (OPEN REDUCTION INTERNAL FIXATION) FRACTURE: ICD-10-CM

## 2021-12-20 PROCEDURE — 99024 POSTOP FOLLOW-UP VISIT: CPT | Performed by: STUDENT IN AN ORGANIZED HEALTH CARE EDUCATION/TRAINING PROGRAM

## 2021-12-20 NOTE — PROGRESS NOTES
"Chief Complaint  Follow-up of the Left Ankle    Subjective          Rosie Patel presents to [facility] for   History of Present Illness      The patient presents here today for follow up evaluation of the left ankle. The patient is S/P ORIF left ankle fracture 10/1/21. She has been wearing a CAM walker boot. She reports she is still having some pain. She reports she can ambulate on the ankle. She as no other complaints.   Allergies   Allergen Reactions   • Corticosteroids Other (See Comments)     Pt states unable to take due to blood pressure   • Sulfa Antibiotics Itching        Social History     Socioeconomic History   • Marital status: Single   Tobacco Use   • Smoking status: Former Smoker     Packs/day: 0.25     Types: Cigarettes     Start date: 10/1/1972   • Smokeless tobacco: Never Used   Vaping Use   • Vaping Use: Never used   Substance and Sexual Activity   • Alcohol use: Not Currently   • Drug use: Never   • Sexual activity: Defer        I reviewed the patient's chief complaint, history of present illness, review of systems, past medical history, surgical history, family history, social history, medications, and allergy list.     REVIEW OF SYSTEMS    Constitutional: Denies fevers, chills, weight loss  Cardiovascular: Denies chest pain, shortness of breath  Skin: Denies rashes, acute skin changes  Neurologic: Denies headache, loss of consciousness  MSK: Left ankle pain      Objective   Vital Signs:   Pulse 58   Ht 170.2 cm (67\")   Wt 80.3 kg (177 lb)   SpO2 95%   BMI 27.72 kg/m²     Body mass index is 27.72 kg/m².    Physical Exam    Left ankle- Well healed lateral incision. Previous medial blister is healed. Intact plantar flexion and dorsiflexion. Positive EHL, FHL, GS and TA. Sensation intact to all 5 nerves of the foot. Positive pulses. Calf soft. No pain with syndesmotic squeeze. Dorsiflexion neutral plantar flexion 30.     Procedures    Imaging Results (Most " Recent)     Procedure Component Value Units Date/Time    XR Ankle 3+ View Left [[id number]] Resulted: 12/20/21 1645     Updated: 12/20/21 1645    Narrative:      Indications: Follow-up left ankle fracture    Views: AP, oblique, lateral left ankle    Findings: Left lateral malleolus fracture appears healed.  No hardware   complications noted.  Talus remains well reduced.  Syndesmotic overlap   appears appropriate.  Baseline arthritic changes in the ankle are stable.    Comparative Data: Perative data found and reviewed today                     Assessment and Plan    Diagnoses and all orders for this visit:    1. Left ankle pain, unspecified chronicity (Primary)  -     XR Ankle 3+ View Left    2. S/P ORIF (open reduction internal fixation) fracture         Discussed the treatment plan with the patient.  The patient was given an ankle brace today to wean into. Home exercises given today. She can use her boot as needed.     Call or return if symptoms worsen or patient has any concerns.   Will obtain X-Rays of Left ankle at next visit.     Scribed for Carlos Jorge MD by Carlos Jorge MD  12/20/2021   15:27 EST         Follow Up   Return in about 3 weeks (around 1/10/2022).  Patient was given instructions and counseling regarding her condition or for health maintenance advice. Please see specific information pulled into the AVS if appropriate.       I have personally performed the services described in this document as scribed by the above individual and it is both accurate and complete.     Carlos Jorge MD  12/21/21  14:37 EST
